# Patient Record
Sex: FEMALE | Race: OTHER | NOT HISPANIC OR LATINO | Employment: FULL TIME | URBAN - METROPOLITAN AREA
[De-identification: names, ages, dates, MRNs, and addresses within clinical notes are randomized per-mention and may not be internally consistent; named-entity substitution may affect disease eponyms.]

---

## 2022-10-10 ENCOUNTER — HOSPITAL ENCOUNTER (OUTPATIENT)
Facility: MEDICAL CENTER | Age: 42
End: 2022-10-10
Attending: PREVENTIVE MEDICINE
Payer: COMMERCIAL

## 2022-10-10 ENCOUNTER — EH NON-PROVIDER (OUTPATIENT)
Dept: OCCUPATIONAL MEDICINE | Facility: CLINIC | Age: 42
End: 2022-10-10

## 2022-10-10 ENCOUNTER — EMPLOYEE HEALTH (OUTPATIENT)
Dept: OCCUPATIONAL MEDICINE | Facility: CLINIC | Age: 42
End: 2022-10-10

## 2022-10-10 DIAGNOSIS — Z02.89 ENCOUNTER FOR OCCUPATIONAL HEALTH ASSESSMENT: ICD-10-CM

## 2022-10-10 DIAGNOSIS — Z02.1 PRE-EMPLOYMENT DRUG SCREENING: ICD-10-CM

## 2022-10-10 LAB
ALBUMIN SERPL BCP-MCNC: 4.2 G/DL (ref 3.2–4.9)
ALBUMIN/GLOB SERPL: 1.3 G/DL
ALP SERPL-CCNC: 48 U/L (ref 30–99)
ALT SERPL-CCNC: 53 U/L (ref 2–50)
AMP AMPHETAMINE: NORMAL
ANION GAP SERPL CALC-SCNC: 10 MMOL/L (ref 7–16)
AST SERPL-CCNC: 40 U/L (ref 12–45)
BAR BARBITURATES: NORMAL
BILIRUB SERPL-MCNC: 0.3 MG/DL (ref 0.1–1.5)
BUN SERPL-MCNC: 10 MG/DL (ref 8–22)
BZO BENZODIAZEPINES: NORMAL
CALCIUM SERPL-MCNC: 9.3 MG/DL (ref 8.5–10.5)
CHLORIDE SERPL-SCNC: 102 MMOL/L (ref 96–112)
CO2 SERPL-SCNC: 26 MMOL/L (ref 20–33)
COC COCAINE: NORMAL
CREAT SERPL-MCNC: 0.36 MG/DL (ref 0.5–1.4)
GFR SERPLBLD CREATININE-BSD FMLA CKD-EPI: 131 ML/MIN/1.73 M 2
GLOBULIN SER CALC-MCNC: 3.2 G/DL (ref 1.9–3.5)
GLUCOSE SERPL-MCNC: 98 MG/DL (ref 65–99)
INT CON NEG: NORMAL
INT CON POS: NORMAL
MDMA ECSTASY: NORMAL
MET METHAMPHETAMINES: NORMAL
MTD METHADONE: NORMAL
OPI OPIATES: NORMAL
OXY OXYCODONE: NORMAL
PCP PHENCYCLIDINE: NORMAL
POC URINE DRUG SCREEN OCDRS: NEGATIVE
POTASSIUM SERPL-SCNC: 3.7 MMOL/L (ref 3.6–5.5)
PROT SERPL-MCNC: 7.4 G/DL (ref 6–8.2)
SODIUM SERPL-SCNC: 138 MMOL/L (ref 135–145)
THC: NORMAL

## 2022-10-10 PROCEDURE — 86480 TB TEST CELL IMMUN MEASURE: CPT | Performed by: PREVENTIVE MEDICINE

## 2022-10-10 PROCEDURE — 94375 RESPIRATORY FLOW VOLUME LOOP: CPT | Performed by: NURSE PRACTITIONER

## 2022-10-10 PROCEDURE — 8915 PR COMPREHENSIVE PHYSICAL: Performed by: PREVENTIVE MEDICINE

## 2022-10-10 PROCEDURE — 90707 MMR VACCINE SC: CPT | Performed by: NURSE PRACTITIONER

## 2022-10-10 PROCEDURE — 80053 COMPREHEN METABOLIC PANEL: CPT | Performed by: PREVENTIVE MEDICINE

## 2022-10-10 PROCEDURE — 80305 DRUG TEST PRSMV DIR OPT OBS: CPT | Performed by: PREVENTIVE MEDICINE

## 2022-10-10 PROCEDURE — 90716 VAR VACCINE LIVE SUBQ: CPT | Performed by: NURSE PRACTITIONER

## 2022-10-10 PROCEDURE — 90686 IIV4 VACC NO PRSV 0.5 ML IM: CPT | Performed by: NURSE PRACTITIONER

## 2022-10-11 LAB
GAMMA INTERFERON BACKGROUND BLD IA-ACNC: 0.05 IU/ML
M TB IFN-G BLD-IMP: NEGATIVE
M TB IFN-G CD4+ BCKGRND COR BLD-ACNC: 0.12 IU/ML
MITOGEN IGNF BCKGRD COR BLD-ACNC: 9.93 IU/ML
QFT TB2 - NIL TBQ2: 0.11 IU/ML

## 2022-10-17 ENCOUNTER — RESEARCH ENCOUNTER (OUTPATIENT)
Dept: RESEARCH | Facility: WORKSITE | Age: 42
End: 2022-10-17
Payer: COMMERCIAL

## 2022-10-17 ENCOUNTER — EH NON-PROVIDER (OUTPATIENT)
Dept: OCCUPATIONAL MEDICINE | Facility: CLINIC | Age: 42
End: 2022-10-17

## 2022-10-17 DIAGNOSIS — Z00.6 RESEARCH STUDY PATIENT: ICD-10-CM

## 2022-11-07 LAB
APOB+LDLR+PCSK9 GENE MUT ANL BLD/T: NOT DETECTED
BRCA1+BRCA2 DEL+DUP + FULL MUT ANL BLD/T: NOT DETECTED
MLH1+MSH2+MSH6+PMS2 GN DEL+DUP+FUL M: NOT DETECTED

## 2023-03-07 ENCOUNTER — OFFICE VISIT (OUTPATIENT)
Dept: MEDICAL GROUP | Facility: CLINIC | Age: 43
End: 2023-03-07
Payer: COMMERCIAL

## 2023-03-07 VITALS — WEIGHT: 111 LBS | RESPIRATION RATE: 16 BRPM | BODY MASS INDEX: 20.43 KG/M2 | HEIGHT: 62 IN

## 2023-03-07 DIAGNOSIS — Z02.89 HISTORY AND PHYSICAL EXAMINATION, IMMIGRATION: ICD-10-CM

## 2023-03-07 PROCEDURE — 7101 PR PHYSICAL: Performed by: FAMILY MEDICINE

## 2023-03-07 NOTE — PROGRESS NOTES
Here today for immigration exam and paperwork.  Denies significant PMH  Has Migraines -  beta blockers for prophylaxis    Denies psychiatric hx, recent STI (2 years) or TB exposure.    Exam:    Gen: Well developed, well nourished in no acute distress.   Skin: Pink, warm, and dry  HEENT: conjunctiva non-injected, sclera non-icteric. EOMs intact.   Nasal mucosa without edema nor erythema.   Pinna normal.    Oral mucous membranes pink and moist with no lesions.  Neck: Supple, trachea midline. No adenopathy or masses in the neck or supraclavicular regions.  Lungs: Effort is normal. Clear to auscultation bilaterally with good excursion.  CV: regular rate and rhythm, no murmurs  Abdomen: soft, nontender, + BS. No HSM.  No CVAT  Ext: no edema, color normal, vascularity normal, temperature normal  Alert and oriented Eye contact is good, speech goal directed, affect calm    A/P  Immigration exam  Ordered appropriate labs (Quant TB, RPR, GC)  Immunizations: all UTD, including Covid and flu

## 2023-03-13 ENCOUNTER — HOSPITAL ENCOUNTER (OUTPATIENT)
Dept: LAB | Facility: MEDICAL CENTER | Age: 43
End: 2023-03-13
Attending: FAMILY MEDICINE
Payer: COMMERCIAL

## 2023-03-13 DIAGNOSIS — Z02.89 HISTORY AND PHYSICAL EXAMINATION, IMMIGRATION: ICD-10-CM

## 2023-03-13 PROCEDURE — 87591 N.GONORRHOEAE DNA AMP PROB: CPT

## 2023-03-13 PROCEDURE — 86480 TB TEST CELL IMMUN MEASURE: CPT

## 2023-03-13 PROCEDURE — 86780 TREPONEMA PALLIDUM: CPT

## 2023-03-13 PROCEDURE — 36415 COLL VENOUS BLD VENIPUNCTURE: CPT

## 2023-03-13 PROCEDURE — 87491 CHLMYD TRACH DNA AMP PROBE: CPT

## 2023-03-14 LAB
C TRACH DNA SPEC QL NAA+PROBE: NEGATIVE
GAMMA INTERFERON BACKGROUND BLD IA-ACNC: 0.05 IU/ML
M TB IFN-G BLD-IMP: NEGATIVE
M TB IFN-G CD4+ BCKGRND COR BLD-ACNC: 0.27 IU/ML
MITOGEN IGNF BCKGRD COR BLD-ACNC: >10 IU/ML
N GONORRHOEA DNA SPEC QL NAA+PROBE: NEGATIVE
QFT TB2 - NIL TBQ2: 0.22 IU/ML
SPECIMEN SOURCE: NORMAL
T PALLIDUM AB SER QL IA: NORMAL